# Patient Record
Sex: MALE | Race: WHITE | NOT HISPANIC OR LATINO | URBAN - METROPOLITAN AREA
[De-identification: names, ages, dates, MRNs, and addresses within clinical notes are randomized per-mention and may not be internally consistent; named-entity substitution may affect disease eponyms.]

---

## 2020-10-07 ENCOUNTER — OFFICE VISIT (OUTPATIENT)
Dept: URBAN - METROPOLITAN AREA CLINIC 118 | Facility: CLINIC | Age: 30
End: 2020-10-07
Payer: COMMERCIAL

## 2020-10-07 DIAGNOSIS — K85.90 RECURRENT ACUTE PANCREATITIS: ICD-10-CM

## 2020-10-07 PROCEDURE — 1036F TOBACCO NON-USER: CPT | Performed by: INTERNAL MEDICINE

## 2020-10-07 PROCEDURE — 99204 OFFICE O/P NEW MOD 45 MIN: CPT | Performed by: INTERNAL MEDICINE

## 2020-10-07 PROCEDURE — G8482 FLU IMMUNIZE ORDER/ADMIN: HCPCS | Performed by: INTERNAL MEDICINE

## 2020-10-07 PROCEDURE — G8427 DOCREV CUR MEDS BY ELIG CLIN: HCPCS | Performed by: INTERNAL MEDICINE

## 2020-10-07 PROCEDURE — G8417 CALC BMI ABV UP PARAM F/U: HCPCS | Performed by: INTERNAL MEDICINE

## 2020-10-07 NOTE — HPI-TODAY'S VISIT:
The patient was last seen in 2014 for a hx of acute idiopathic pancreatitis.  All studies (serologies, lipids, calcium) as well as US and MRCP were negative.  It is not clear if he had autoimmune markers, and hereditary pancreatitis panel was not done.  He had no attacks from 2008 until August 2020, when he was hospitalized at [facility] for acute non-necrotizing interstitial pancreatitis.  He says his pain has now resolved, and he has no N/V/abdominal pain/diarrhea-steatorrhea.  He drinks minimal EtOH (1 beer every 2 weeks) and was on no meds prior to the attack.  There is no family hx of pancreatitis or gallbladder disease.

## 2020-10-11 ENCOUNTER — TELEPHONE ENCOUNTER (OUTPATIENT)
Dept: URBAN - METROPOLITAN AREA CLINIC 92 | Facility: CLINIC | Age: 30
End: 2020-10-11

## 2020-10-18 ENCOUNTER — LAB OUTSIDE AN ENCOUNTER (OUTPATIENT)
Dept: URBAN - METROPOLITAN AREA CLINIC 92 | Facility: CLINIC | Age: 30
End: 2020-10-18

## 2020-10-23 LAB
ANA DIRECT: NEGATIVE
CHOLESTEROL, TOTAL: 124
COMMENT:: (no result)
HDL CHOLESTEROL: 51
IGG, SUBCLASS 1: 404
IGG, SUBCLASS 2: 234
IGG, SUBCLASS 3: 24
IGG, SUBCLASS 4: 52
IMMUNOGLOBULIN G, QN, SERUM: 883
LDL CHOL CALC (NIH): 58
LIPASE: 33
ROUTING: (no result)
TRIGLYCERIDES: 73
VLDL CHOLESTEROL CAL: 15

## 2020-11-05 ENCOUNTER — OFFICE VISIT (OUTPATIENT)
Dept: URBAN - METROPOLITAN AREA CLINIC 118 | Facility: CLINIC | Age: 30
End: 2020-11-05

## 2020-12-02 ENCOUNTER — LAB OUTSIDE AN ENCOUNTER (OUTPATIENT)
Dept: URBAN - METROPOLITAN AREA CLINIC 118 | Facility: CLINIC | Age: 30
End: 2020-12-02

## 2020-12-02 ENCOUNTER — OFFICE VISIT (OUTPATIENT)
Dept: URBAN - METROPOLITAN AREA CLINIC 118 | Facility: CLINIC | Age: 30
End: 2020-12-02
Payer: COMMERCIAL

## 2020-12-02 DIAGNOSIS — K85.90 RECURRENT ACUTE PANCREATITIS: ICD-10-CM

## 2020-12-02 DIAGNOSIS — R11.0 NAUSEA: ICD-10-CM

## 2020-12-02 DIAGNOSIS — K86.1: ICD-10-CM

## 2020-12-02 DIAGNOSIS — R68.81 EARLY SATIETY: ICD-10-CM

## 2020-12-02 PROCEDURE — G8482 FLU IMMUNIZE ORDER/ADMIN: HCPCS | Performed by: INTERNAL MEDICINE

## 2020-12-02 PROCEDURE — 99213 OFFICE O/P EST LOW 20 MIN: CPT | Performed by: INTERNAL MEDICINE

## 2020-12-02 PROCEDURE — G8417 CALC BMI ABV UP PARAM F/U: HCPCS | Performed by: INTERNAL MEDICINE

## 2020-12-02 PROCEDURE — 1036F TOBACCO NON-USER: CPT | Performed by: INTERNAL MEDICINE

## 2020-12-02 PROCEDURE — G8427 DOCREV CUR MEDS BY ELIG CLIN: HCPCS | Performed by: INTERNAL MEDICINE

## 2020-12-02 NOTE — HPI-TODAY'S VISIT:
The patient is here to follow-up on his blood work for inherited pancreatitis.  His autoimmune studies were negative.  He has had no recent attack of pancreatitis and denies any significant abdominal pain, nausea and vomiting, abnormal loss of weight, or blood in his stools.  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

## 2021-03-09 ENCOUNTER — OFFICE VISIT (OUTPATIENT)
Dept: URBAN - METROPOLITAN AREA CLINIC 118 | Facility: CLINIC | Age: 31
End: 2021-03-09
Payer: COMMERCIAL

## 2021-03-09 DIAGNOSIS — K86.1: ICD-10-CM

## 2021-03-09 DIAGNOSIS — R19.8 IRREGULAR BOWEL HABITS: ICD-10-CM

## 2021-03-09 PROCEDURE — 99214 OFFICE O/P EST MOD 30 MIN: CPT | Performed by: INTERNAL MEDICINE

## 2021-03-09 RX ORDER — DOXYCYCLINE 40 MG/1
1 CAPSULE IN THE MORNING ON AN EMPTY STOMACH CAPSULE ORAL ONCE A DAY
Status: ACTIVE | COMMUNITY

## 2021-03-09 NOTE — PHYSICAL EXAM CONSTITUTIONAL:
Health Maintenance Due   Topic Date Due   • DTaP/Tdap/Td Vaccine (1 - Tdap) 08/04/1960   • Shingles Vaccine (1 of 2) 08/04/1992   • Influenza Vaccine (1) 09/01/2019   • Medicare Wellness 65+  11/14/2019   • Depression Screening  11/14/2019       Patient is here for his medicare wellness today.           well developed, well nourished , in no acute distress , ambulating without difficulty , normal communication ability

## 2021-03-09 NOTE — HPI-TODAY'S VISIT:
Since the patient's last visit in December, he has had no further admissions to the hospital for pancreatitis.  He was recently treated for [place] spotted fever for a symptom complex of fatigue, body aches, and emesis, intermittent diarrhea, and fatigue.  He does not know if his liver enzymes were elevated.  He completed his doxycycline a few weeks ago.  He does have mild persistent nausea.  His stools are somewhat irregular and loose, but there is no gross osteotomy of.  He has no vomiting and no severe abdominal pain.

## 2021-06-09 ENCOUNTER — DASHBOARD ENCOUNTERS (OUTPATIENT)
Age: 31
End: 2021-06-09

## 2021-06-09 ENCOUNTER — OFFICE VISIT (OUTPATIENT)
Dept: URBAN - METROPOLITAN AREA CLINIC 118 | Facility: CLINIC | Age: 31
End: 2021-06-09
Payer: COMMERCIAL

## 2021-06-09 ENCOUNTER — LAB OUTSIDE AN ENCOUNTER (OUTPATIENT)
Dept: URBAN - METROPOLITAN AREA CLINIC 118 | Facility: CLINIC | Age: 31
End: 2021-06-09

## 2021-06-09 DIAGNOSIS — K90.9 STEATORRHEA: ICD-10-CM

## 2021-06-09 DIAGNOSIS — K86.1: ICD-10-CM

## 2021-06-09 DIAGNOSIS — R11.2 NON-INTRACTABLE VOMITING WITH NAUSEA, UNSPECIFIED VOMITING TYPE: ICD-10-CM

## 2021-06-09 PROCEDURE — 99214 OFFICE O/P EST MOD 30 MIN: CPT | Performed by: INTERNAL MEDICINE

## 2021-06-09 RX ORDER — DOXYCYCLINE 40 MG/1
1 CAPSULE IN THE MORNING ON AN EMPTY STOMACH CAPSULE ORAL ONCE A DAY
Status: ACTIVE | COMMUNITY

## 2021-06-09 RX ORDER — PANCRELIPASE 60000; 12000; 38000 [USP'U]/1; [USP'U]/1; [USP'U]/1
1 CAPSULE CAPSULE, DELAYED RELEASE PELLETS ORAL
Qty: 90 CAPSULE | Refills: 11 | OUTPATIENT
Start: 2021-06-09 | End: 2022-06-04

## 2021-06-09 NOTE — HPI-TODAY'S VISIT:
The patient is following up after an office visit in March.  He did try the pancreatic enzymes we supplied for about 2 weeks and his bowel movements were more consistent.  He had several good days while he was on them, but since stopping them he has had occasional nausea, vomiting, and loose stools.  He has gained 2 pounds however and shows no signs of malabsorption.  There is no blood in the stools and no debilitating pain consistent with acute pancreatitis.  He has not started any other new medications.  He has no acid reflux or severe dyspepsia.

## 2021-06-12 PROBLEM — 66187002: Status: ACTIVE | Noted: 2021-06-09

## 2021-06-12 PROBLEM — 68072000: Status: ACTIVE | Noted: 2020-12-02

## 2022-11-30 NOTE — PHYSICAL EXAM PSYCH:
normal mood with appropriate affect How Many Skin Cancers Have You Had?: one What Is The Reason For Today's Visit?: Follow Up Non-Melanoma Skin Cancer When Was Your Last Cancer Diagnosed?: 11/5/18

## 2023-01-01 NOTE — PHYSICAL EXAM CHEST:
no lesions,  no deformities,  no traumatic injuries,  no significant scars are present,  chest wall non-tender,  no masses present, breathing is unlabored without accessory muscle use,normal breath sounds
6